# Patient Record
Sex: MALE | Race: BLACK OR AFRICAN AMERICAN | HISPANIC OR LATINO | URBAN - METROPOLITAN AREA
[De-identification: names, ages, dates, MRNs, and addresses within clinical notes are randomized per-mention and may not be internally consistent; named-entity substitution may affect disease eponyms.]

---

## 2021-02-06 ENCOUNTER — EMERGENCY (EMERGENCY)
Facility: HOSPITAL | Age: 31
LOS: 1 days | Discharge: ANOTHER HOSPITAL | End: 2021-02-06
Attending: EMERGENCY MEDICINE | Admitting: EMERGENCY MEDICINE
Payer: SELF-PAY

## 2021-02-06 ENCOUNTER — EMERGENCY (EMERGENCY)
Facility: HOSPITAL | Age: 31
LOS: 1 days | Discharge: HOME | End: 2021-02-06
Attending: EMERGENCY MEDICINE | Admitting: EMERGENCY MEDICINE
Payer: SELF-PAY

## 2021-02-06 VITALS
RESPIRATION RATE: 20 BRPM | DIASTOLIC BLOOD PRESSURE: 91 MMHG | HEART RATE: 89 BPM | TEMPERATURE: 99 F | SYSTOLIC BLOOD PRESSURE: 151 MMHG | OXYGEN SATURATION: 100 % | HEIGHT: 72 IN

## 2021-02-06 VITALS
RESPIRATION RATE: 16 BRPM | TEMPERATURE: 99 F | SYSTOLIC BLOOD PRESSURE: 146 MMHG | HEART RATE: 73 BPM | OXYGEN SATURATION: 95 % | DIASTOLIC BLOOD PRESSURE: 75 MMHG

## 2021-02-06 VITALS
DIASTOLIC BLOOD PRESSURE: 91 MMHG | HEIGHT: 72 IN | OXYGEN SATURATION: 96 % | TEMPERATURE: 101 F | SYSTOLIC BLOOD PRESSURE: 162 MMHG | WEIGHT: 169.98 LBS | HEART RATE: 89 BPM | RESPIRATION RATE: 16 BRPM

## 2021-02-06 DIAGNOSIS — M00.9 PYOGENIC ARTHRITIS, UNSPECIFIED: ICD-10-CM

## 2021-02-06 DIAGNOSIS — M25.562 PAIN IN LEFT KNEE: ICD-10-CM

## 2021-02-06 DIAGNOSIS — L03.116 CELLULITIS OF LEFT LOWER LIMB: ICD-10-CM

## 2021-02-06 LAB
ALBUMIN SERPL ELPH-MCNC: 3.1 G/DL — LOW (ref 3.4–5)
ALP SERPL-CCNC: 84 U/L — SIGNIFICANT CHANGE UP (ref 40–120)
ALT FLD-CCNC: 60 U/L — HIGH (ref 12–42)
ANION GAP SERPL CALC-SCNC: 8 MMOL/L — LOW (ref 9–16)
APTT BLD: 32.3 SEC — SIGNIFICANT CHANGE UP (ref 27.5–35.5)
AST SERPL-CCNC: 49 U/L — HIGH (ref 15–37)
BASOPHILS # BLD AUTO: 0 K/UL — SIGNIFICANT CHANGE UP (ref 0–0.2)
BASOPHILS NFR BLD AUTO: 0 % — SIGNIFICANT CHANGE UP (ref 0–2)
BILIRUB SERPL-MCNC: 0.2 MG/DL — SIGNIFICANT CHANGE UP (ref 0.2–1.2)
BUN SERPL-MCNC: 13 MG/DL — SIGNIFICANT CHANGE UP (ref 7–23)
CALCIUM SERPL-MCNC: 9.5 MG/DL — SIGNIFICANT CHANGE UP (ref 8.5–10.5)
CHLORIDE SERPL-SCNC: 97 MMOL/L — SIGNIFICANT CHANGE UP (ref 96–108)
CO2 SERPL-SCNC: 28 MMOL/L — SIGNIFICANT CHANGE UP (ref 22–31)
CREAT SERPL-MCNC: 0.99 MG/DL — SIGNIFICANT CHANGE UP (ref 0.5–1.3)
CRP SERPL-MCNC: >12 MG/DL — HIGH (ref 0–0.9)
EOSINOPHIL # BLD AUTO: 0 K/UL — SIGNIFICANT CHANGE UP (ref 0–0.5)
EOSINOPHIL NFR BLD AUTO: 0 % — SIGNIFICANT CHANGE UP (ref 0–6)
ETHANOL SERPL-MCNC: <3 MG/DL — SIGNIFICANT CHANGE UP
GIANT PLATELETS BLD QL SMEAR: PRESENT — SIGNIFICANT CHANGE UP
GLUCOSE SERPL-MCNC: 149 MG/DL — HIGH (ref 70–99)
HCT VFR BLD CALC: 39.2 % — SIGNIFICANT CHANGE UP (ref 39–50)
HGB BLD-MCNC: 13.1 G/DL — SIGNIFICANT CHANGE UP (ref 13–17)
INR BLD: 1.31 — HIGH (ref 0.88–1.16)
LACTATE SERPL-SCNC: 2.1 MMOL/L — HIGH (ref 0.4–2)
LIDOCAIN IGE QN: 62 U/L — LOW (ref 73–393)
LYMPHOCYTES # BLD AUTO: 0.78 K/UL — LOW (ref 1–3.3)
LYMPHOCYTES # BLD AUTO: 4 % — LOW (ref 13–44)
MAGNESIUM SERPL-MCNC: 1.8 MG/DL — SIGNIFICANT CHANGE UP (ref 1.6–2.6)
MANUAL SMEAR VERIFICATION: SIGNIFICANT CHANGE UP
MCHC RBC-ENTMCNC: 27.2 PG — SIGNIFICANT CHANGE UP (ref 27–34)
MCHC RBC-ENTMCNC: 33.4 GM/DL — SIGNIFICANT CHANGE UP (ref 32–36)
MCV RBC AUTO: 81.3 FL — SIGNIFICANT CHANGE UP (ref 80–100)
MONOCYTES # BLD AUTO: 2.34 K/UL — HIGH (ref 0–0.9)
MONOCYTES NFR BLD AUTO: 12 % — SIGNIFICANT CHANGE UP (ref 2–14)
NEUTROPHILS # BLD AUTO: 16.37 K/UL — HIGH (ref 1.8–7.4)
NEUTROPHILS NFR BLD AUTO: 84 % — HIGH (ref 43–77)
NRBC # BLD: 0 /100 — SIGNIFICANT CHANGE UP (ref 0–0)
NRBC # BLD: SIGNIFICANT CHANGE UP /100 WBCS (ref 0–0)
PLAT MORPH BLD: ABNORMAL
PLATELET # BLD AUTO: 347 K/UL — SIGNIFICANT CHANGE UP (ref 150–400)
POTASSIUM SERPL-MCNC: 3.9 MMOL/L — SIGNIFICANT CHANGE UP (ref 3.5–5.3)
POTASSIUM SERPL-SCNC: 3.9 MMOL/L — SIGNIFICANT CHANGE UP (ref 3.5–5.3)
PROT SERPL-MCNC: 7.6 G/DL — SIGNIFICANT CHANGE UP (ref 6.4–8.2)
PROTHROM AB SERPL-ACNC: 15.5 SEC — HIGH (ref 10.6–13.6)
RBC # BLD: 4.82 M/UL — SIGNIFICANT CHANGE UP (ref 4.2–5.8)
RBC # FLD: 14.1 % — SIGNIFICANT CHANGE UP (ref 10.3–14.5)
RBC BLD AUTO: NORMAL — SIGNIFICANT CHANGE UP
SARS-COV-2 RNA SPEC QL NAA+PROBE: SIGNIFICANT CHANGE UP
SODIUM SERPL-SCNC: 133 MMOL/L — SIGNIFICANT CHANGE UP (ref 132–145)
WBC # BLD: 19.49 K/UL — HIGH (ref 3.8–10.5)
WBC # FLD AUTO: 19.49 K/UL — HIGH (ref 3.8–10.5)

## 2021-02-06 PROCEDURE — 93010 ELECTROCARDIOGRAM REPORT: CPT

## 2021-02-06 PROCEDURE — 73564 X-RAY EXAM KNEE 4 OR MORE: CPT | Mod: 26,LT

## 2021-02-06 PROCEDURE — 99283 EMERGENCY DEPT VISIT LOW MDM: CPT

## 2021-02-06 PROCEDURE — 99284 EMERGENCY DEPT VISIT MOD MDM: CPT

## 2021-02-06 PROCEDURE — 99285 EMERGENCY DEPT VISIT HI MDM: CPT | Mod: 25

## 2021-02-06 PROCEDURE — 99053 MED SERV 10PM-8AM 24 HR FAC: CPT

## 2021-02-06 RX ORDER — ACETAMINOPHEN 500 MG
650 TABLET ORAL ONCE
Refills: 0 | Status: COMPLETED | OUTPATIENT
Start: 2021-02-06 | End: 2021-02-06

## 2021-02-06 RX ORDER — PIPERACILLIN AND TAZOBACTAM 4; .5 G/20ML; G/20ML
3.38 INJECTION, POWDER, LYOPHILIZED, FOR SOLUTION INTRAVENOUS ONCE
Refills: 0 | Status: COMPLETED | OUTPATIENT
Start: 2021-02-06 | End: 2021-02-06

## 2021-02-06 RX ORDER — VANCOMYCIN HCL 1 G
1250 VIAL (EA) INTRAVENOUS ONCE
Refills: 0 | Status: COMPLETED | OUTPATIENT
Start: 2021-02-06 | End: 2021-02-06

## 2021-02-06 RX ORDER — SODIUM CHLORIDE 9 MG/ML
1000 INJECTION INTRAMUSCULAR; INTRAVENOUS; SUBCUTANEOUS ONCE
Refills: 0 | Status: COMPLETED | OUTPATIENT
Start: 2021-02-06 | End: 2021-02-06

## 2021-02-06 RX ORDER — IBUPROFEN 200 MG
800 TABLET ORAL ONCE
Refills: 0 | Status: COMPLETED | OUTPATIENT
Start: 2021-02-06 | End: 2021-02-06

## 2021-02-06 RX ADMIN — PIPERACILLIN AND TAZOBACTAM 200 GRAM(S): 4; .5 INJECTION, POWDER, LYOPHILIZED, FOR SOLUTION INTRAVENOUS at 20:21

## 2021-02-06 RX ADMIN — Medication 650 MILLIGRAM(S): at 17:54

## 2021-02-06 RX ADMIN — SODIUM CHLORIDE 1000 MILLILITER(S): 9 INJECTION INTRAMUSCULAR; INTRAVENOUS; SUBCUTANEOUS at 21:31

## 2021-02-06 RX ADMIN — Medication 800 MILLIGRAM(S): at 20:21

## 2021-02-06 RX ADMIN — Medication 1250 MILLIGRAM(S): at 22:22

## 2021-02-06 RX ADMIN — Medication 166.67 MILLIGRAM(S): at 21:31

## 2021-02-06 NOTE — ED ADULT TRIAGE NOTE - CHIEF COMPLAINT QUOTE
BIBA from [place] station c/o left knee pain x 4 days. denies injury/trauma/fall. pt uncooperative/hostile in triage, has blanket over face and refuses to take it off. also refuses to raise pants, does not want to show knee. BIBA from [place] [facility] c/o left knee pain x 4 days. denies injury/trauma/fall. pt uncooperative/hostile in triage, has blanket over face and refuses to take it off. also refuses to raise pants, does not want to show knee. requesting food in triage.

## 2021-02-06 NOTE — ED ADULT NURSE NOTE - OTHER COMPLAINTS
transfer from [facility] for ortho consult. dishevelled and asking for food. NPO instructed.

## 2021-02-06 NOTE — ED PROVIDER NOTE - PHYSICAL EXAMINATION
Patient with left knee with marked edema and erythema with and effusion. Area of drainage inferior to the patella suspicious for abscess. Edema extending to the ankle. Highly suspicious for a septic joint  Patient with high degree of pain on movement of left knee   cannot bear weight or range

## 2021-02-06 NOTE — ED PROVIDER NOTE - CHPI ED SYMPTOMS NEG
no dizziness/no fever/no nausea/no numbness/no tingling/no vomiting/no chills/no decreased eating/drinking

## 2021-02-06 NOTE — ED ADULT NURSE NOTE - OBJECTIVE STATEMENT
PT transferred from [facility] ED for ortho evaluation. PT currently uncooperative with RN care and evaluation. PT refuses to acknowledge primary RN and registration.  PT is conscious and is alert during EMS transport and with triage RN. Pt refused further care with triage RN.  Pt asking for food. PT transferred from [facility] ED for ortho evaluation. PT currently uncooperative with RN care and evaluation. PT refuses to acknowledge primary RN and registration.  PT is conscious and is alert during EMS transport and with triage RN. Pt refused further care with triage RN.  Pt asking for food. Unable to assess leg at this time. Pt refuses to remove to change and states he will not remove his pants.

## 2021-02-06 NOTE — ED ADULT NURSE REASSESSMENT NOTE - NS ED NURSE REASSESS COMMENT FT1
PT refuses to answer RN evaluation questions, refuses to change into gown.  Pt curled up and went to sleep, pt currently feigning sleep.  PT asking for food and is aware he is to be NPO.  Charge RN is aware.

## 2021-02-06 NOTE — ED ADULT NURSE NOTE - CHIEF COMPLAINT QUOTE
BIBA from [place] station c/o left knee pain x 4 days. denies injury/trauma/fall. pt uncooperative/hostile in triage, has blanket over face and refuses to take it off. also refuses to raise pants, does not want to show knee. requesting food in triage.

## 2021-02-06 NOTE — ED PROVIDER NOTE - PATIENT PORTAL LINK FT
You can access the FollowMyHealth Patient Portal offered by [facility] by registering at the following website: [email or web address]. By joining [employer]’s FollowMyHealth portal, you will also be able to view your health information using other applications (apps) compatible with our system.

## 2021-02-06 NOTE — ED PROVIDER NOTE - PROGRESS NOTE DETAILS
Patient initially would not let provider evaluate left knee. Patient later agreed to eval. Patient with left knee with marked edema and erythema with and effusion. Area of drainage inferior to the patella suspicious for abscess. Edema extending to the ankle. Highly suspicious for a septic joint. Endorsed to Orthopedics and agree ED-ED eval for Ortho evaluation and Tap. Patient endorsed to [facility]. Accepted. Appropriate cultures and lactate drawn prior to departure.

## 2021-02-06 NOTE — ED PROVIDER NOTE - NSFOLLOWUPINSTRUCTIONS_ED_ALL_ED_FT
We are very concerned that you may have a "septic joint" which basically means pus/bacteria growing in your joint. ANY delay in treatment can lead to permanent serious disability or amputation or death. You refused any exam or additional work up at this time.   Please return to the ER as soon as possible to complete your work up and treatment - the earlier we start treatment, the better chances you have at recovery.  At least take the antibiotics that I am prescribing for you.

## 2021-02-06 NOTE — ED ADULT TRIAGE NOTE - OTHER COMPLAINTS
transfer from [facility] for ortho consult. dishevelled and asking for food. NPO instructed.

## 2021-02-06 NOTE — ED ADULT NURSE REASSESSMENT NOTE - NS ED NURSE REASSESS COMMENT FT1
Pt able to ambulate out of ED with limp.  PT escorted by security. Alert and oriented x3.  RN and MD reiterated to PT the need for medical intervention and reiterated risks of not receiving care. PT was able to dress himself and put socks and shoes on.  Pt able to ambulate to bathroom and out of ED with limp.  PT escorted by security. Alert and oriented x3.  RN and MD reiterated to PT the need for medical intervention and reiterated risks of not receiving care.  PT left ED.

## 2021-02-06 NOTE — ED ADULT NURSE NOTE - EXPLANATION OF PATIENT'S REASON FOR LEAVING
PT states he is refusing care and does not want further medical intervention, told staff to discharge him.

## 2021-02-06 NOTE — ED PROVIDER NOTE - PHYSICAL EXAMINATION
pt refusing exam  Pt talking to me when he wants to, able to have coherent conversation but angry with me for keeping him in ER and asking him to get into gown. pt refusing exam  Pt talking to me when he wants to, able to have coherent conversation but angry with me for keeping him in ER and asking him to get into gown.  steady gait w/ mild limp, able to flex knee but limited  FROM all extremities. pt refusing exam  Pt talking to me when he wants to, able to have coherent conversation but angry with me for keeping him in ER and asking him to get into gown.  steady gait w/ mild limp, able to flex knee but limited. prior ED note states pt cant bear weight - pt is able to walk now in ED with mild limp without any assistance.   FROM all extremities.

## 2021-02-06 NOTE — ED PROVIDER NOTE - OBJECTIVE STATEMENT
30M denies any PMH transferred over concern for knee infection. Per prior ED note: Pt initially presented to [facility] w/ L knee pain. Fever 100.8. Uncooperative and poor historian. At [facility] eventually able to obtain blood work. Notable for WBC 19, CRP >12. Eventually allowed exam. ED note states that "Patient with left knee with marked edema and erythema with and effusion. Area of drainage inferior to the patella suspicious for abscess. Edema extending to the ankle. Highly suspicious for a septic joint." Had XR w/ no acute pathology. Was ordered for vanc/zosyn but pt reportedly refused. Transferred to us for further eval.  Here in ED pt is uncooperative. Requesting to "kick me out." Not cooperative w/ HPI and refusing exam.

## 2021-02-06 NOTE — ED PROVIDER NOTE - OBJECTIVE STATEMENT
31 y/o male here from [facility] c/o of left knee pain. patient is a poor historian and uncooperative with questions and exam demanding food. Denies chills, hematuria, abdominal pain, change in bowel function, flank pain, rash, HA, dizziness, SOB, CP, palpitations, diaphoresis, cough, and malaise. patient appears well but cannot bear weight on left knee. Would not show knee to EMS or provider. Unknown length of time for knee pain

## 2021-02-06 NOTE — ED PROVIDER NOTE - CLINICAL SUMMARY MEDICAL DECISION MAKING FREE TEXT BOX
29 y/o male here with left knee pain   on exam Patient with left knee with marked edema and erythema with and effusion. Area of drainage inferior to the patella suspicious for abscess. Edema extending to the ankle. Highly suspicious for a septic joint  cannot bear weight or range     Endorsed to Orthopedics and uptown ED   Eval for septic joint

## 2021-02-06 NOTE — ED PROVIDER NOTE - CLINICAL SUMMARY MEDICAL DECISION MAKING FREE TEXT BOX
30M denies any PMH transferred over concern for knee infection. Per prior ED note: Pt initially presented to [facility] w/ L knee pain. Fever 100.8. Uncooperative and poor historian. At [facility] eventually able to obtain blood work. Notable for WBC 19, CRP >12. Eventually allowed exam. ED note states that "Patient with left knee with marked edema and erythema with and effusion. Area of drainage inferior to the patella suspicious for abscess. Edema extending to the ankle. Highly suspicious for a septic joint." Had XR w/ no acute pathology. Was ordered for vanc/zosyn but pt reportedly refused. Transferred to us for further eval.  Here in ED pt is uncooperative. Requesting to "kick me out." Not cooperative w/ HPI and refusing exam. Current vitals wnl, exam as above. Well appearing but refusing full exam.  ddx: Unable to examine knee but based on prior charts/initial fever/labs, high concern for septic joint.   Pt is clinically sober, A&Ox3, free from distracting injury. Throughout our interactions in the Emergency Department today, the pt has demonstrated concrete thinking/reasoning, has maintained an orderly & reasonable conversation, appears to have intact insight/judgment/reason, a sound mind & therefore in my opinion has capacity now to make decisions. Given the pt’s presentation, I explained, in laymen's terms, my concern for septic joint and high concern for worsening infection which can lead to amputation or death or other permanent disability. Pt statuses that he understand but wants to leave anyways bec he doesn't want to be in ER and doesn't want to be reexamined or get into a gown. The pt verbalized an understanding of my worries. I've communicated to the pt that the ED evaluation is incomplete.  I have discussed the need for further ED/hospital w/u.  We have reviewed the range of possible dx, potential testing & tx options.  Our discussions included the potential outcomes of leaving AMA, including worsening of their condition, becoming permanently disabled/in pain/critically ill, & death.  Despite these efforts, I was unable to persuade the pt to stay.  The pt is refusing any further ED care & is leaving AMA. I have attempted to offer tx/rx/guidance for any dangerous conditions which are most likely and/or dangerous.  I have answered all questions & have implored the pt to return to the ED ASAP to complete the w/u.   At this point, despite my high concern for infection, I cant keep patient in ED further against his will. Additional testing/labs/exam/treatment would all be against his will.  Pt is not providing any collateral/family/friends that I can call to try to persuade him to stay. 30M denies any PMH transferred over concern for knee infection. Per prior ED note: Pt initially presented to [facility] w/ L knee pain. Fever 100.8. Uncooperative and poor historian. At [facility] eventually able to obtain blood work. Notable for WBC 19, CRP >12. Eventually allowed exam. ED note states that "Patient with left knee with marked edema and erythema with and effusion. Area of drainage inferior to the patella suspicious for abscess. Edema extending to the ankle. Highly suspicious for a septic joint." Had XR w/ no acute pathology. Was ordered for vanc/zosyn but pt reportedly refused. Transferred to us for further eval.  Here in ED pt is uncooperative. Requesting to "kick me out." Not cooperative w/ HPI and refusing exam. Current vitals wnl, exam as above. Well appearing but refusing full exam.  ddx: Unable to examine knee but based on prior charts/initial fever/labs, high concern for septic joint vs. abscess vs. cellulitis.  Pt is clinically sober, A&Ox3, free from distracting injury. Throughout our interactions in the Emergency Department today, the pt has demonstrated concrete thinking/reasoning, has maintained an orderly & reasonable conversation, appears to have intact insight/judgment/reason, a sound mind & therefore in my opinion has capacity now to make decisions. Given the pt’s presentation, I explained, in laymen's terms, my concern for septic joint and high concern for worsening infection which can lead to amputation or death or other permanent disability. Pt statuses that he understand but wants to leave anyways bec he doesn't want to be in ER and doesn't want to be reexamined or get into a gown. The pt verbalized an understanding of my worries. I've communicated to the pt that the ED evaluation is incomplete.  I have discussed the need for further ED/hospital w/u.  We have reviewed the range of possible dx, potential testing & tx options.  Our discussions included the potential outcomes of leaving AMA, including worsening of their condition, becoming permanently disabled/in pain/critically ill, & death.  Despite these efforts, I was unable to persuade the pt to stay.  The pt is refusing any further ED care & is leaving AMA. I have attempted to offer tx/rx/guidance for any dangerous conditions which are most likely and/or dangerous.  I have answered all questions & have implored the pt to return to the ED ASAP to complete the w/u.   At this point, despite my high concern for infection, I cant keep patient in ED further against his will. Additional testing/labs/exam/treatment would all be against his will.  Pt is not providing any collateral/family/friends that I can call to try to persuade him to stay.

## 2021-02-06 NOTE — ED ADULT NURSE REASSESSMENT NOTE - NS ED NURSE REASSESS COMMENT FT1
PT states he is refusing care because he wants to sleep and eat food. Pt refuses any medical interventions, refuses to take off pants and to change into gown. PT is alert and oriented x3. Pt is verbally aggressive. Security at bedside, MD Nuñez at bedside.  IV removed after patient refused any further medical interventions and states he will not cooperate with care.  MD Nuñez will AMA the patient.  PT made aware by RN and MD Nuñez of risks of refusing medical care and benefits of cooperating with care. PT continues to refuse.

## 2021-02-06 NOTE — ED PROVIDER NOTE - ATTENDING CONTRIBUTION TO CARE
I have personally seen and examined this patient. I have fully participated in the care of this patient. I have reviewed all pertinent clinical information, including history, physical exam and plan.    31 yo male no known past medical history BIBA from [facility]. In ER noted to have a swollen, erythematous painful right knee with increased warmth and decreased ROM  - appears infected  - possible septic joint - IV abx administered - CHRISTOPH grajeda discussed case with orthopedics plan for ER- ER transfer for ortho evaluation.  leg compartments soft, no crepitus, dp/pt pulses @+ bilaterally

## 2021-02-07 LAB — ERYTHROCYTE [SEDIMENTATION RATE] IN BLOOD: 88 MM/HR — HIGH (ref 0–15)

## 2021-02-12 LAB
CULTURE RESULTS: SIGNIFICANT CHANGE UP
CULTURE RESULTS: SIGNIFICANT CHANGE UP
SPECIMEN SOURCE: SIGNIFICANT CHANGE UP
SPECIMEN SOURCE: SIGNIFICANT CHANGE UP